# Patient Record
Sex: MALE | Race: WHITE | NOT HISPANIC OR LATINO | URBAN - METROPOLITAN AREA
[De-identification: names, ages, dates, MRNs, and addresses within clinical notes are randomized per-mention and may not be internally consistent; named-entity substitution may affect disease eponyms.]

---

## 2019-05-02 ENCOUNTER — TELEPHONE (OUTPATIENT)
Dept: HEMATOLOGY/ONCOLOGY | Facility: CLINIC | Age: 34
End: 2019-05-02

## 2019-05-02 NOTE — TELEPHONE ENCOUNTER
Per Dr. Baez - patient would be best assessed by PCP, PCP may order lab if deems a candidate for that test      Attempted to return call, invalid phone number for return call, went to a solicitation call.

## 2019-05-02 NOTE — TELEPHONE ENCOUNTER
----- Message from Mary Jo Noriega sent at 5/1/2019  1:40 PM CDT -----  Contact: Mother  Type: Needs Medical Advice    Who Called:  Alexus-Mother  Best Call Back Number: [phone]  Additional Information: need a call back wants to know if  will do a test called  Methehemoglobenia she would like a call back not sure if that is he correct spelling

## 2021-07-01 ENCOUNTER — PATIENT MESSAGE (OUTPATIENT)
Dept: ADMINISTRATIVE | Facility: OTHER | Age: 36
End: 2021-07-01